# Patient Record
Sex: MALE | Race: WHITE | NOT HISPANIC OR LATINO | Employment: FULL TIME | URBAN - METROPOLITAN AREA
[De-identification: names, ages, dates, MRNs, and addresses within clinical notes are randomized per-mention and may not be internally consistent; named-entity substitution may affect disease eponyms.]

---

## 2023-04-27 ENCOUNTER — OFFICE VISIT (OUTPATIENT)
Dept: URGENT CARE | Facility: CLINIC | Age: 28
End: 2023-04-27
Payer: COMMERCIAL

## 2023-04-27 VITALS
DIASTOLIC BLOOD PRESSURE: 77 MMHG | BODY MASS INDEX: 23.7 KG/M2 | HEIGHT: 72 IN | TEMPERATURE: 98 F | HEART RATE: 84 BPM | SYSTOLIC BLOOD PRESSURE: 119 MMHG | WEIGHT: 175 LBS | OXYGEN SATURATION: 98 % | RESPIRATION RATE: 20 BRPM

## 2023-04-27 DIAGNOSIS — R31.9 HEMATURIA, UNSPECIFIED TYPE: Primary | ICD-10-CM

## 2023-04-27 LAB
BILIRUB UR QL STRIP: NEGATIVE
GLUCOSE UR QL STRIP: NEGATIVE
KETONES UR QL STRIP: NEGATIVE
LEUKOCYTE ESTERASE UR QL STRIP: NEGATIVE
PH, POC UA: 5 (ref 5–8)
POC BLOOD, URINE: POSITIVE
POC NITRATES, URINE: NEGATIVE
PROT UR QL STRIP: POSITIVE
SP GR UR STRIP: 1.02 (ref 1–1.03)
UROBILINOGEN UR STRIP-ACNC: NORMAL (ref 0.3–2.2)

## 2023-04-27 PROCEDURE — 81003 URINALYSIS AUTO W/O SCOPE: CPT | Mod: QW,S$GLB,, | Performed by: NURSE PRACTITIONER

## 2023-04-27 PROCEDURE — 87086 URINE CULTURE/COLONY COUNT: CPT | Performed by: NURSE PRACTITIONER

## 2023-04-27 PROCEDURE — 99203 PR OFFICE/OUTPT VISIT, NEW, LEVL III, 30-44 MIN: ICD-10-PCS | Mod: S$GLB,,, | Performed by: NURSE PRACTITIONER

## 2023-04-27 PROCEDURE — 81003 POCT URINALYSIS, DIPSTICK, AUTOMATED, W/O SCOPE: ICD-10-PCS | Mod: QW,S$GLB,, | Performed by: NURSE PRACTITIONER

## 2023-04-27 PROCEDURE — 99203 OFFICE O/P NEW LOW 30 MIN: CPT | Mod: S$GLB,,, | Performed by: NURSE PRACTITIONER

## 2023-04-27 RX ORDER — LISDEXAMFETAMINE DIMESYLATE 30 MG/1
30 CAPSULE ORAL EVERY MORNING
COMMUNITY
Start: 2023-03-16 | End: 2023-05-18 | Stop reason: CLARIF

## 2023-04-27 NOTE — PROGRESS NOTES
Subjective:      Patient ID: Jagdeep Ariza is a 27 y.o. male.    Vitals:  height is 6' (1.829 m) and weight is 79.4 kg (175 lb). His tympanic temperature is 98.3 °F (36.8 °C). His blood pressure is 119/77 and his pulse is 84. His respiration is 20 and oxygen saturation is 98%.     Chief Complaint: Hematuria    Patient presents today with blood in his urine that he realize after working out on today about an hour ago.   Provider note begins below      Denies abdominal pain, back pain, or urinary discharge.  Reports blood noted in urine.  He has been training for a marathon.  Has been running about 10 miles a day.  There was mention that there was not enough fluid intake or nutritional intake.  Father has a history of kidney stones.  Denies muscle aching or cramps denies contractures.  Wife is concerned that he may have rhabdomyolysis.    Hematuria  This is a new problem. The problem is unchanged. He describes the hematuria as gross hematuria. The hematuria occurs during the initial portion of his urinary stream. He reports no clotting in his urine stream. His pain is at a severity of 0/10. He is experiencing no pain. He describes his urine color as dark red. Irritative symptoms do not include frequency or urgency. Pertinent negatives include no chills, dysuria, fever, flank pain, nausea or vomiting.   Constitution: Negative for chills and fever.   Cardiovascular:  Negative for chest pain and sob on exertion.   Respiratory:  Negative for cough and shortness of breath.    Gastrointestinal:  Negative for nausea, vomiting, constipation and diarrhea.   Genitourinary:  Positive for hematuria. Negative for dysuria, frequency, urgency, urine decreased, flank pain, bladder incontinence and bed wetting.   Musculoskeletal:  Negative for muscle cramps and muscle ache.    Objective:     Physical Exam   Constitutional: He is oriented to person, place, and time.   HENT:   Head: Normocephalic and atraumatic.   Cardiovascular:  Normal rate, regular rhythm and normal heart sounds.   Pulmonary/Chest: Effort normal. No respiratory distress.   Abdominal: Normal appearance. He exhibits no distension and no mass. Soft. flat abdomen There is no abdominal tenderness. There is no rebound, no guarding, no left CVA tenderness and no right CVA tenderness. No hernia.   Neurological: He is alert and oriented to person, place, and time.   Skin: Skin is dry.   Psychiatric: His behavior is normal. Mood normal.       Results for orders placed or performed in visit on 04/27/23   POCT Urinalysis, Dipstick, Automated, W/O Scope   Result Value Ref Range    POC Blood, Urine Positive (A) Negative    POC Bilirubin, Urine Negative Negative    POC Urobilinogen, Urine normal 0.3 - 2.2    POC Ketones, Urine Negative Negative    POC Protein, Urine Positive (A) Negative    POC Nitrates, Urine Negative Negative    POC Glucose, Urine Negative Negative    pH, UA 5.0 5 - 8    POC Specific Gravity, Urine 1.020 1.003 - 1.029    POC Leukocytes, Urine Negative Negative      Assessment:     1. Hematuria, unspecified type        Plan:     Asymptomatic hematuria   Recommend fluids, electrolytes, increase calorie intake   Urine for culture  Referral for Urology  Emergency precautions given   No running for 3 days.      Hematuria, unspecified type  -     POCT Urinalysis, Dipstick, Automated, W/O Scope

## 2023-04-28 LAB — BACTERIA UR CULT: NO GROWTH

## 2023-04-29 ENCOUNTER — TELEPHONE (OUTPATIENT)
Dept: URGENT CARE | Facility: CLINIC | Age: 28
End: 2023-04-29
Payer: COMMERCIAL

## 2023-04-29 NOTE — TELEPHONE ENCOUNTER
Spoke to patient updated on negative urine culture results. Patient reports he no longer noticed blood in his urine but reports will schedule an appointment with urology for further evaluation.

## 2023-05-02 ENCOUNTER — OFFICE VISIT (OUTPATIENT)
Dept: UROLOGY | Facility: CLINIC | Age: 28
End: 2023-05-02
Payer: COMMERCIAL

## 2023-05-02 VITALS — HEART RATE: 67 BPM | SYSTOLIC BLOOD PRESSURE: 126 MMHG | DIASTOLIC BLOOD PRESSURE: 83 MMHG

## 2023-05-02 DIAGNOSIS — R31.0 GROSS HEMATURIA: Primary | ICD-10-CM

## 2023-05-02 PROCEDURE — 3074F SYST BP LT 130 MM HG: CPT | Mod: CPTII,S$GLB,, | Performed by: NURSE PRACTITIONER

## 2023-05-02 PROCEDURE — 1160F PR REVIEW ALL MEDS BY PRESCRIBER/CLIN PHARMACIST DOCUMENTED: ICD-10-PCS | Mod: CPTII,S$GLB,, | Performed by: NURSE PRACTITIONER

## 2023-05-02 PROCEDURE — 1159F PR MEDICATION LIST DOCUMENTED IN MEDICAL RECORD: ICD-10-PCS | Mod: CPTII,S$GLB,, | Performed by: NURSE PRACTITIONER

## 2023-05-02 PROCEDURE — 99203 OFFICE O/P NEW LOW 30 MIN: CPT | Mod: S$GLB,,, | Performed by: NURSE PRACTITIONER

## 2023-05-02 PROCEDURE — 1159F MED LIST DOCD IN RCRD: CPT | Mod: CPTII,S$GLB,, | Performed by: NURSE PRACTITIONER

## 2023-05-02 PROCEDURE — 1160F RVW MEDS BY RX/DR IN RCRD: CPT | Mod: CPTII,S$GLB,, | Performed by: NURSE PRACTITIONER

## 2023-05-02 PROCEDURE — 3079F DIAST BP 80-89 MM HG: CPT | Mod: CPTII,S$GLB,, | Performed by: NURSE PRACTITIONER

## 2023-05-02 PROCEDURE — 3079F PR MOST RECENT DIASTOLIC BLOOD PRESSURE 80-89 MM HG: ICD-10-PCS | Mod: CPTII,S$GLB,, | Performed by: NURSE PRACTITIONER

## 2023-05-02 PROCEDURE — 99203 PR OFFICE/OUTPT VISIT, NEW, LEVL III, 30-44 MIN: ICD-10-PCS | Mod: S$GLB,,, | Performed by: NURSE PRACTITIONER

## 2023-05-02 PROCEDURE — 3074F PR MOST RECENT SYSTOLIC BLOOD PRESSURE < 130 MM HG: ICD-10-PCS | Mod: CPTII,S$GLB,, | Performed by: NURSE PRACTITIONER

## 2023-05-02 RX ORDER — DEXTROAMPHETAMINE SULFATE 5 MG/1
5 TABLET ORAL DAILY
COMMUNITY
Start: 2023-04-25

## 2023-05-02 NOTE — PROGRESS NOTES
Subjective:      Jagdeep Ariza is a 27 y.o. male who was referred by Elina Balderas NP for evaluation of his hematuria.      The patient presented to urgent care on 5/27 with painless hematuria (dark red/brown urine) following exercise. Resolved spontaneously same day. Denies excessive muscle aches/cramps.     Hematuria  Patient complains of gross hematuria. Onset of hematuria was 5 days ago and was sudden in onset. There is not a history of nephrolithiasis. There is not a history of urologic trauma. Other urologic symptoms include none. Patient admits to history of no risk factors for cancer. Patient denies history of Agent Orange exposure, chronic Young catheter,  surgeries, occupational exposure, sexually transmitted diseases, tobacco use, trauma, and urolithiasis. Prior workup has been UA'S and culture.     Component      Latest Ref Rng & Units 4/27/2023   Urine Culture, Routine No growth     + family history of stones (father).     The following portions of the patient's history were reviewed and updated as appropriate: allergies, current medications, past family history, past medical history, past social history, past surgical history and problem list.    Review of Systems  Constitutional: no fever or chills  ENT: no nasal congestion or sore throat  Respiratory: no cough or shortness of breath  Cardiovascular: no chest pain or palpitations  Gastrointestinal: no nausea or vomiting, tolerating diet  Genitourinary: as per HPI  Hematologic/Lymphatic: no easy bruising or lymphadenopathy  Musculoskeletal: no arthralgias or myalgias  Neurological: no seizures or tremors  Behavioral/Psych: no auditory or visual hallucinations     Objective:   Vitals:   Vitals:    05/02/23 0712   BP: 126/83   Pulse: 67     Physical Exam   General: alert and oriented, no acute distress  Head: normocephalic, atraumatic  Neck: supple, normal ROM  Respiratory: Symmetric expansion, non-labored breathing  Cardiovascular: regular rate and  rhythm  Abdomen: soft, non tender, non distended  Genitourinary: deferred  Skin: normal coloration and turgor, no rashes, no suspicious skin lesions noted  Neuro: alert and oriented x3, no gross deficits  Psych: normal judgment and insight, normal mood/affect, and non-anxious    Lab Review   Urinalysis demonstrates negative for all components  No results found for: WBC, HGB, HCT, MCV, PLT  No results found for: CREATININE, BUN  No results found for: PSA  Imaging   None    Assessment:     1. Gross hematuria      Plan:   Diagnoses and all orders for this visit:    Gross hematuria  -     CT Urogram Abd Pelvis W WO; Future  -     Cystoscopy; Future  -     Basic Metabolic Panel; Future    Plan:  -- Discussed differential diagnosis for hematuria, including infection, nephrolithiasis, benign prostatic bleeding, and neoplasms of kidney, ureter, or bladder.  -- Recommended proceeding with full hematuria workup, to include CT urogram and cystoscopy  -- Will send urine for culture  -- CT urogram next available  -- Cystoscopy in clinic after CT

## 2023-05-05 ENCOUNTER — OFFICE VISIT (OUTPATIENT)
Dept: PRIMARY CARE CLINIC | Facility: CLINIC | Age: 28
End: 2023-05-05
Payer: COMMERCIAL

## 2023-05-05 VITALS
SYSTOLIC BLOOD PRESSURE: 102 MMHG | DIASTOLIC BLOOD PRESSURE: 68 MMHG | HEIGHT: 72 IN | WEIGHT: 175.06 LBS | OXYGEN SATURATION: 98 % | BODY MASS INDEX: 23.71 KG/M2 | HEART RATE: 40 BPM

## 2023-05-05 DIAGNOSIS — J31.0 CHRONIC RHINITIS: ICD-10-CM

## 2023-05-05 DIAGNOSIS — J45.40 MODERATE PERSISTENT ASTHMA WITHOUT COMPLICATION: Primary | ICD-10-CM

## 2023-05-05 PROCEDURE — 1160F RVW MEDS BY RX/DR IN RCRD: CPT | Mod: CPTII,S$GLB,, | Performed by: FAMILY MEDICINE

## 2023-05-05 PROCEDURE — 1159F PR MEDICATION LIST DOCUMENTED IN MEDICAL RECORD: ICD-10-PCS | Mod: CPTII,S$GLB,, | Performed by: FAMILY MEDICINE

## 2023-05-05 PROCEDURE — 3078F PR MOST RECENT DIASTOLIC BLOOD PRESSURE < 80 MM HG: ICD-10-PCS | Mod: CPTII,S$GLB,, | Performed by: FAMILY MEDICINE

## 2023-05-05 PROCEDURE — 3074F PR MOST RECENT SYSTOLIC BLOOD PRESSURE < 130 MM HG: ICD-10-PCS | Mod: CPTII,S$GLB,, | Performed by: FAMILY MEDICINE

## 2023-05-05 PROCEDURE — 3008F PR BODY MASS INDEX (BMI) DOCUMENTED: ICD-10-PCS | Mod: CPTII,S$GLB,, | Performed by: FAMILY MEDICINE

## 2023-05-05 PROCEDURE — 3008F BODY MASS INDEX DOCD: CPT | Mod: CPTII,S$GLB,, | Performed by: FAMILY MEDICINE

## 2023-05-05 PROCEDURE — 99203 OFFICE O/P NEW LOW 30 MIN: CPT | Mod: S$GLB,,, | Performed by: FAMILY MEDICINE

## 2023-05-05 PROCEDURE — 99203 PR OFFICE/OUTPT VISIT, NEW, LEVL III, 30-44 MIN: ICD-10-PCS | Mod: S$GLB,,, | Performed by: FAMILY MEDICINE

## 2023-05-05 PROCEDURE — 1159F MED LIST DOCD IN RCRD: CPT | Mod: CPTII,S$GLB,, | Performed by: FAMILY MEDICINE

## 2023-05-05 PROCEDURE — 1160F PR REVIEW ALL MEDS BY PRESCRIBER/CLIN PHARMACIST DOCUMENTED: ICD-10-PCS | Mod: CPTII,S$GLB,, | Performed by: FAMILY MEDICINE

## 2023-05-05 PROCEDURE — 3078F DIAST BP <80 MM HG: CPT | Mod: CPTII,S$GLB,, | Performed by: FAMILY MEDICINE

## 2023-05-05 PROCEDURE — 3074F SYST BP LT 130 MM HG: CPT | Mod: CPTII,S$GLB,, | Performed by: FAMILY MEDICINE

## 2023-05-05 PROCEDURE — 99999 PR PBB SHADOW E&M-EST. PATIENT-LVL III: ICD-10-PCS | Mod: PBBFAC,,, | Performed by: FAMILY MEDICINE

## 2023-05-05 PROCEDURE — 99999 PR PBB SHADOW E&M-EST. PATIENT-LVL III: CPT | Mod: PBBFAC,,, | Performed by: FAMILY MEDICINE

## 2023-05-05 RX ORDER — FLUTICASONE PROPIONATE 50 MCG
1 SPRAY, SUSPENSION (ML) NASAL DAILY
COMMUNITY
End: 2023-06-06

## 2023-05-05 RX ORDER — MONTELUKAST SODIUM 10 MG/1
10 TABLET ORAL NIGHTLY
Qty: 90 TABLET | Refills: 3 | Status: SHIPPED | OUTPATIENT
Start: 2023-05-05 | End: 2023-09-04 | Stop reason: SDUPTHER

## 2023-05-05 RX ORDER — ALBUTEROL SULFATE 90 UG/1
2 AEROSOL, METERED RESPIRATORY (INHALATION) EVERY 4 HOURS PRN
Qty: 18 G | Refills: 11 | Status: SHIPPED | OUTPATIENT
Start: 2023-05-05 | End: 2023-12-12 | Stop reason: SDUPTHER

## 2023-05-05 RX ORDER — MONTELUKAST SODIUM 10 MG/1
10 TABLET ORAL NIGHTLY
Qty: 90 TABLET | Refills: 3 | Status: SHIPPED | OUTPATIENT
Start: 2023-05-05 | End: 2023-05-05 | Stop reason: SDUPTHER

## 2023-05-05 NOTE — PROGRESS NOTES
Patient Name: Jagdeep Ariza    : 1995  MRN: [id number]      Subjective:     Patient ID: Jagdeep is a 27 y.o. male    Chief Complaint:  Asthma and Allergic Reaction    27-year-old male presents for evaluation of asthma.  Initially scheduled appointment to establish care however after he was informed that I do not routinely working this office, he scheduled an appointment with provider who does and for now would only like to address the asthma.  He reports increased asthma symptoms a multiple days a week, and multiple nights over the last few weeks.  He has not had a full-blown asthma.  However he has had to use his a albuterol inhaler more often.  He has an albuterol inhaler from [place].  He states that his asthma is triggered by allergies, which are triggered by animals including dogs and cats.  He owns a cat at home.       Review of Systems   Constitutional:  Negative for chills and fever.   Respiratory:  Positive for chest tightness, shortness of breath and wheezing.    Cardiovascular:  Negative for chest pain and palpitations.   Gastrointestinal:  Negative for abdominal pain and blood in stool.      Objective:   /68   Pulse (!) 40   Ht 6' (1.829 m)   Wt 79.4 kg (175 lb 0.7 oz)   SpO2 98%   BMI 23.74 kg/m²     Physical Exam  Vitals reviewed.   Constitutional:       General: He is not in acute distress.     Appearance: He is well-developed and normal weight. He is not diaphoretic.   HENT:      Head: Normocephalic.      Right Ear: External ear normal.      Left Ear: External ear normal.      Nose: Nose normal.      Mouth/Throat:      Pharynx: No oropharyngeal exudate.   Neck:      Trachea: No tracheal deviation.   Cardiovascular:      Rate and Rhythm: Normal rate and regular rhythm.      Heart sounds: Normal heart sounds.   Pulmonary:      Effort: Pulmonary effort is normal.      Breath sounds: Normal breath sounds. No wheezing or rales.   Abdominal:      General: Bowel sounds are normal.       Palpations: Abdomen is soft. Abdomen is not rigid. There is no mass.      Tenderness: There is no abdominal tenderness. There is no guarding or rebound.   Musculoskeletal:      Cervical back: Normal range of motion and neck supple.   Lymphadenopathy:      Cervical: No cervical adenopathy.   Neurological:      Mental Status: He is alert and oriented to person, place, and time.      Gait: Gait normal.      Assessment        ICD-10-CM ICD-9-CM   1. Moderate persistent asthma without complication  J45.40 493.90   2. Chronic rhinitis  J31.0 472.0         Plan:     1. Moderate persistent asthma without complication  -     Discontinue: montelukast (SINGULAIR) 10 mg tablet; Take 1 tablet (10 mg total) by mouth every evening.  Dispense: 90 tablet; Refill: 3  -     montelukast (SINGULAIR) 10 mg tablet; Take 1 tablet (10 mg total) by mouth every evening.  Dispense: 90 tablet; Refill: 3  -     albuterol (PROVENTIL/VENTOLIN HFA) 90 mcg/actuation inhaler; Inhale 2 puffs into the lungs every 4 (four) hours as needed for Wheezing or Shortness of Breath. Rescue  Dispense: 18 g; Refill: 11    2. Chronic rhinitis  -     montelukast (SINGULAIR) 10 mg tablet; Take 1 tablet (10 mg total) by mouth every evening.  Dispense: 90 tablet; Refill: 3         Discussed starting Singulair as control of her asthma as well as a management of chronic rhinitis.  Discussed black box warning with patient.  Patient agreeable to start medication.    Prescription for albuterol inhaler provided.  Patient educated on correct use of inhaler via video: [email or web address]/[email or web address]?[email or web address].  Patient was able to explain back how to use inhaler.        -Hilario Holt Jr., MD, AAHIVS      This office note has been dictated.  This dictation has been generated using M-Modal Fluency Direct dictation; some phonetic errors may occur.         There are no Patient Instructions on file for this visit.      Future Appointments   Date Time Provider Department  Center   5/10/2023  2:00 PM BAP CT OP LIMIT [address] [facility]   6/6/2023 10:00 AM [facility]

## 2023-05-10 ENCOUNTER — HOSPITAL ENCOUNTER (OUTPATIENT)
Dept: RADIOLOGY | Facility: OTHER | Age: 28
Discharge: HOME | End: 2023-05-10
Attending: NURSE PRACTITIONER
Payer: COMMERCIAL

## 2023-05-10 DIAGNOSIS — R31.0 GROSS HEMATURIA: ICD-10-CM

## 2023-05-10 DIAGNOSIS — N20.0 NEPHROLITHIASIS: Primary | ICD-10-CM

## 2023-05-10 PROCEDURE — 74178 CT ABD&PLV WO CNTR FLWD CNTR: CPT | Mod: TC

## 2023-05-10 PROCEDURE — 25500020 PHARM REV CODE 255: Performed by: NURSE PRACTITIONER

## 2023-05-10 PROCEDURE — 74178 CT ABD&PLV WO CNTR FLWD CNTR: CPT | Mod: 26,,, | Performed by: RADIOLOGY

## 2023-05-10 PROCEDURE — 74178 CT UROGRAM ABD PELVIS W WO: ICD-10-PCS | Mod: 26,,, | Performed by: RADIOLOGY

## 2023-05-10 RX ADMIN — IOHEXOL 125 ML: 350 INJECTION, SOLUTION INTRAVENOUS at 02:05

## 2023-05-11 ENCOUNTER — HOSPITAL ENCOUNTER (OUTPATIENT)
Dept: RADIOLOGY | Facility: OTHER | Age: 28
Discharge: HOME | End: 2023-05-11
Attending: NURSE PRACTITIONER
Payer: COMMERCIAL

## 2023-05-11 DIAGNOSIS — N20.0 NEPHROLITHIASIS: ICD-10-CM

## 2023-05-11 PROCEDURE — 74018 RADEX ABDOMEN 1 VIEW: CPT | Mod: 26,,, | Performed by: RADIOLOGY

## 2023-05-11 PROCEDURE — 74018 RADEX ABDOMEN 1 VIEW: CPT | Mod: TC,FY

## 2023-05-11 PROCEDURE — 74018 XR KUB: ICD-10-PCS | Mod: 26,,, | Performed by: RADIOLOGY

## 2023-05-12 ENCOUNTER — OFFICE VISIT (OUTPATIENT)
Dept: UROLOGY | Facility: CLINIC | Age: 28
End: 2023-05-12
Payer: COMMERCIAL

## 2023-05-12 VITALS
SYSTOLIC BLOOD PRESSURE: 126 MMHG | BODY MASS INDEX: 23.59 KG/M2 | DIASTOLIC BLOOD PRESSURE: 61 MMHG | HEART RATE: 42 BPM | HEIGHT: 72 IN | WEIGHT: 174.19 LBS

## 2023-05-12 DIAGNOSIS — N20.0 NEPHROLITHIASIS: Primary | ICD-10-CM

## 2023-05-12 DIAGNOSIS — R31.0 GROSS HEMATURIA: ICD-10-CM

## 2023-05-12 PROCEDURE — 99214 OFFICE O/P EST MOD 30 MIN: CPT | Mod: S$GLB,,, | Performed by: NURSE PRACTITIONER

## 2023-05-12 PROCEDURE — 3008F PR BODY MASS INDEX (BMI) DOCUMENTED: ICD-10-PCS | Mod: CPTII,S$GLB,, | Performed by: NURSE PRACTITIONER

## 2023-05-12 PROCEDURE — 3074F SYST BP LT 130 MM HG: CPT | Mod: CPTII,S$GLB,, | Performed by: NURSE PRACTITIONER

## 2023-05-12 PROCEDURE — 1160F RVW MEDS BY RX/DR IN RCRD: CPT | Mod: CPTII,S$GLB,, | Performed by: NURSE PRACTITIONER

## 2023-05-12 PROCEDURE — 99214 PR OFFICE/OUTPT VISIT, EST, LEVL IV, 30-39 MIN: ICD-10-PCS | Mod: S$GLB,,, | Performed by: NURSE PRACTITIONER

## 2023-05-12 PROCEDURE — 3074F PR MOST RECENT SYSTOLIC BLOOD PRESSURE < 130 MM HG: ICD-10-PCS | Mod: CPTII,S$GLB,, | Performed by: NURSE PRACTITIONER

## 2023-05-12 PROCEDURE — 3078F PR MOST RECENT DIASTOLIC BLOOD PRESSURE < 80 MM HG: ICD-10-PCS | Mod: CPTII,S$GLB,, | Performed by: NURSE PRACTITIONER

## 2023-05-12 PROCEDURE — 1159F MED LIST DOCD IN RCRD: CPT | Mod: CPTII,S$GLB,, | Performed by: NURSE PRACTITIONER

## 2023-05-12 PROCEDURE — 3078F DIAST BP <80 MM HG: CPT | Mod: CPTII,S$GLB,, | Performed by: NURSE PRACTITIONER

## 2023-05-12 PROCEDURE — 1159F PR MEDICATION LIST DOCUMENTED IN MEDICAL RECORD: ICD-10-PCS | Mod: CPTII,S$GLB,, | Performed by: NURSE PRACTITIONER

## 2023-05-12 PROCEDURE — 1160F PR REVIEW ALL MEDS BY PRESCRIBER/CLIN PHARMACIST DOCUMENTED: ICD-10-PCS | Mod: CPTII,S$GLB,, | Performed by: NURSE PRACTITIONER

## 2023-05-12 PROCEDURE — 3008F BODY MASS INDEX DOCD: CPT | Mod: CPTII,S$GLB,, | Performed by: NURSE PRACTITIONER

## 2023-05-12 RX ORDER — CIPROFLOXACIN 2 MG/ML
400 INJECTION, SOLUTION INTRAVENOUS
Status: CANCELLED | OUTPATIENT
Start: 2023-05-12

## 2023-05-12 RX ORDER — SODIUM CHLORIDE 9 MG/ML
INJECTION, SOLUTION INTRAVENOUS CONTINUOUS
Status: CANCELLED | OUTPATIENT
Start: 2023-05-12

## 2023-05-12 RX ORDER — KETOROLAC TROMETHAMINE 10 MG/1
10 TABLET, FILM COATED ORAL EVERY 6 HOURS PRN
Qty: 20 TABLET | Refills: 0 | Status: SHIPPED | OUTPATIENT
Start: 2023-05-12 | End: 2023-05-18 | Stop reason: CLARIF

## 2023-05-12 NOTE — H&P
"Subjective:      Jagdeep Ariza is a 27 y.o. male who returns today regarding his nephrolithiasis.    The patient presented to urgent care on 5/27 with painless hematuria (dark red/brown urine) following exercise. Resolved spontaneously same day. Denies excessive muscle aches/cramps.      Hematuria  Patient complains of gross hematuria. Onset of hematuria was 5 days ago and was sudden in onset. There is not a history of nephrolithiasis. There is not a history of urologic trauma. Other urologic symptoms include none. Patient admits to history of no risk factors for cancer. Patient denies history of Agent Orange exposure, chronic Young catheter,  surgeries, occupational exposure, sexually transmitted diseases, tobacco use, trauma, and urolithiasis. Prior workup has been UA'S and culture.      Component      Latest Ref Rng & Units 4/27/2023   Urine Culture, Routine No growth      + family history of stones (father).     CT urogram demonstrated "Kidneys are symmetric in size.  Precontrast imaging shows a calcified stone upper pole left kidney measuring 8 mm.  Kidneys concentrate and excrete contrast appropriately on postcontrast imaging.  No focal filling defects.  Urinary bladder shows mild circumferential wall thickening."    Returns today with KUB- "Nonspecific, nonobstructive bowel gas pattern.  Large retained stool in the colon.  Calcification projecting over the upper pole left kidney measures 5 mm.  No suspicious calcifications along the course of either ureter or urinary bladder."    Denies flank pain or further hematuria.    The following portions of the patient's history were reviewed and updated as appropriate: allergies, current medications, past family history, past medical history, past social history, past surgical history and problem list.    Review of Systems  Constitutional: no fever or chills  ENT: no nasal congestion or sore throat  Respiratory: no cough or shortness of breath  Cardiovascular: no " "chest pain or palpitations  Gastrointestinal: no nausea or vomiting, tolerating diet  Genitourinary: as per HPI  Hematologic/Lymphatic: no easy bruising or lymphadenopathy  Musculoskeletal: no arthralgias or myalgias  Neurological: no seizures or tremors  Behavioral/Psych: no auditory or visual hallucinations     Objective:   Vitals:   Vitals:    05/12/23 0930   BP: 126/61   Pulse: (!) 42     Physical Exam   General: alert and oriented, no acute distress  Head: normocephalic, atraumatic  Neck: supple, normal ROM  Respiratory: Symmetric expansion, non-labored breathing  Cardiovascular: regular rate and rhythm  Abdomen: soft, non tender, non distended  Genitourinary: deferred  Skin: normal coloration and turgor, no rashes, no suspicious skin lesions noted  Neuro: alert and oriented x3, no gross deficits  Psych: normal judgment and insight, normal mood/affect, and non-anxious    Lab Review   Urinalysis demonstrates :no sample   No results found for: WBC, HGB, HCT, MCV, PLT  Lab Results   Component Value Date    CREATININE 1.0 05/02/2023    BUN 10 05/02/2023     No results found for: PSA  Imaging   (all images personally reviewed; agree with report below)  CT urogram- "Kidneys are symmetric in size.  Precontrast imaging shows a calcified stone upper pole left kidney measuring 8 mm.  Kidneys concentrate and excrete contrast appropriately on postcontrast imaging.  No focal filling defects.  Urinary bladder shows mild circumferential wall thickening."  KUB- "Nonspecific, nonobstructive bowel gas pattern.  Large retained stool in the colon.  Calcification projecting over the upper pole left kidney measures 5 mm.  No suspicious calcifications along the course of either ureter or urinary bladder."    Assessment:     1. Nephrolithiasis    2. Gross hematuria      Plan:   Jagdeep was seen today for nephrolithiasis.    Diagnoses and all orders for this visit:    Nephrolithiasis  -     ketorolac (TORADOL) 10 mg tablet; Take 1 " tablet (10 mg total) by mouth every 6 (six) hours as needed for Pain.    Gross hematuria    Plan:  --Advised of the options of watch and wait vs intervention via ureteroscopic approach vs ESWL. Pt elects to proceed with L ESWL + cysto (to complete hematuria workup) with Dr. Nye on 5/30. Discussed the risks/benefits of the procedure. Answered all questions. Consent signed  --Toradol PRN if pain develops   --Recommend general preventive measures, to include increased hydration, low Na diet, and increased citrus intake  --Discussed indications to present to ED, including fever, intractable pain, and intractable nausea/vomitting

## 2023-05-18 ENCOUNTER — HOSPITAL ENCOUNTER (OUTPATIENT)
Dept: PREADMISSION TESTING | Facility: OTHER | Age: 28
Discharge: HOME | End: 2023-05-18
Attending: UROLOGY
Payer: COMMERCIAL

## 2023-05-18 ENCOUNTER — ANESTHESIA EVENT (OUTPATIENT)
Dept: SURGERY | Facility: OTHER | Age: 28
End: 2023-05-18
Payer: COMMERCIAL

## 2023-05-18 VITALS
HEIGHT: 72 IN | SYSTOLIC BLOOD PRESSURE: 115 MMHG | RESPIRATION RATE: 18 BRPM | OXYGEN SATURATION: 99 % | BODY MASS INDEX: 23.57 KG/M2 | WEIGHT: 174 LBS | HEART RATE: 50 BPM | DIASTOLIC BLOOD PRESSURE: 65 MMHG | TEMPERATURE: 98 F

## 2023-05-18 RX ORDER — SODIUM CHLORIDE 0.9 % (FLUSH) 0.9 %
3 SYRINGE (ML) INJECTION
Status: DISCONTINUED | OUTPATIENT
Start: 2023-05-18 | End: 2023-05-19 | Stop reason: HOSPADM

## 2023-05-18 RX ORDER — ACETAMINOPHEN 500 MG
1000 TABLET ORAL
Status: CANCELLED | OUTPATIENT
Start: 2023-05-18 | End: 2023-05-18

## 2023-05-18 RX ORDER — LIDOCAINE HYDROCHLORIDE 10 MG/ML
0.5 INJECTION, SOLUTION EPIDURAL; INFILTRATION; INTRACAUDAL; PERINEURAL ONCE
Status: CANCELLED | OUTPATIENT
Start: 2023-05-18 | End: 2023-05-18

## 2023-05-18 RX ORDER — PROCHLORPERAZINE EDISYLATE 5 MG/ML
5 INJECTION INTRAMUSCULAR; INTRAVENOUS EVERY 30 MIN PRN
Status: CANCELLED | OUTPATIENT
Start: 2023-05-18

## 2023-05-18 RX ORDER — DIPHENHYDRAMINE HYDROCHLORIDE 50 MG/ML
25 INJECTION INTRAMUSCULAR; INTRAVENOUS EVERY 6 HOURS PRN
Status: CANCELLED | OUTPATIENT
Start: 2023-05-18

## 2023-05-18 RX ORDER — ALBUTEROL SULFATE 2.5 MG/.5ML
2.5 SOLUTION RESPIRATORY (INHALATION) EVERY 4 HOURS PRN
Status: CANCELLED | OUTPATIENT
Start: 2023-05-18

## 2023-05-18 RX ORDER — HYDROMORPHONE HYDROCHLORIDE 2 MG/ML
0.4 INJECTION, SOLUTION INTRAMUSCULAR; INTRAVENOUS; SUBCUTANEOUS EVERY 5 MIN PRN
Status: CANCELLED | OUTPATIENT
Start: 2023-05-18

## 2023-05-18 RX ORDER — MEPERIDINE HYDROCHLORIDE 25 MG/ML
12.5 INJECTION INTRAMUSCULAR; INTRAVENOUS; SUBCUTANEOUS ONCE AS NEEDED
Status: CANCELLED | OUTPATIENT
Start: 2023-05-18 | End: 2023-05-19

## 2023-05-18 RX ORDER — SODIUM CHLORIDE, SODIUM LACTATE, POTASSIUM CHLORIDE, CALCIUM CHLORIDE 600; 310; 30; 20 MG/100ML; MG/100ML; MG/100ML; MG/100ML
INJECTION, SOLUTION INTRAVENOUS CONTINUOUS
Status: CANCELLED | OUTPATIENT
Start: 2023-05-18

## 2023-05-18 RX ORDER — OXYCODONE HYDROCHLORIDE 5 MG/1
5 TABLET ORAL
Status: CANCELLED | OUTPATIENT
Start: 2023-05-18

## 2023-05-18 NOTE — ANESTHESIA PREPROCEDURE EVALUATION
05/18/2023  Jagdeep Ariza is a 27 y.o., male.      Pre-op Assessment    I have reviewed the Patient Summary Reports.     I have reviewed the Nursing Notes. I have reviewed the NPO Status.   I have reviewed the Medications.     Review of Systems  Anesthesia Hx:  Denies Family Hx of Anesthesia complications.   Denies Personal Hx of Anesthesia complications.   Social:  Non-Smoker    Hematology/Oncology:  Hematology Normal   Oncology Normal     EENT/Dental:EENT/Dental Normal   Cardiovascular:  Cardiovascular Normal     Pulmonary:   Asthma    Renal/:   Chronic Renal Disease renal calculi    Hepatic/GI:  Hepatic/GI Normal    Musculoskeletal:  Musculoskeletal Normal    Neurological:  Neurology Normal    Endocrine:  Endocrine Normal    Dermatological:  Skin Normal    Psych:  Psychiatric Normal           Physical Exam  General: Well nourished, Cooperative, Alert and Oriented    Airway:  Mallampati: II   Mouth Opening: Normal  TM Distance: Normal  Tongue: Normal  Neck ROM: Normal ROM    Dental:  Intact        Anesthesia Plan  Type of Anesthesia, risks & benefits discussed:    Anesthesia Type: Gen Supraglottic Airway  Intra-op Monitoring Plan: Standard ASA Monitors  Post Op Pain Control Plan: multimodal analgesia  Induction:  IV  Informed Consent: Informed consent signed with the Patient and all parties understand the risks and agree with anesthesia plan.  All questions answered.   ASA Score: 2  Anesthesia Plan Notes: resp tx ordered    Ready For Surgery From Anesthesia Perspective.     .

## 2023-05-18 NOTE — DISCHARGE INSTRUCTIONS
Information to Prepare you for your Surgery    PRE-ADMIT TESTING -  [phone]    [address] [facility]          Your surgery has been scheduled at [facility]. We are pleased to have the opportunity to serve you. For Further Information please call [phone].    On the day of surgery please report to the Information Desk on the 1st floor.    CONTACT YOUR PHYSICIAN'S OFFICE THE DAY PRIOR TO YOUR SURGERY TO OBTAIN YOUR ARRIVAL TIME.     The evening before surgery do not eat anything after 9 p.m. ( this includes hard candy, chewing gum and mints).  You may only have GATORADE, POWERADE AND WATER  from 9 p.m. until you leave your home.   DO NOT DRINK ANY LIQUIDS ON THE WAY TO THE HOSPITAL.      Why does your anesthesiologist allow you to drink Gatorade/Powerade before surgery?  Gatorade/Powerade helps to increase your comfort before surgery and to decrease your nausea after surgery. The carbohydrates in Gatorade/Powerade help reduce your body's stress response to surgery.  If you are a diabetic-drink only water prior to surgery.       Patients may have 2 visitors pre and post procedure. Only 2 visitors will be allowed in the Surgical building with the patient. No one under the age of 12 will be allowed into the facility.    SPECIAL MEDICATION INSTRUCTIONS: TAKE medications checked off by the Anesthesiologist on your Medication List.    Angiogram Patients: Take medications as instructed by your physician, including aspirin.     Surgery Patients:    If you take ASPIRIN - Your PHYSICIAN/SURGEON will need to inform you IF/OR when you need to stop taking aspirin prior to your surgery.     The week prior to surgery do not ot take any medications containing IBUPROFEN or NSAIDS ( Advil, Motrin, Goodys, BC, Aleve, Naproxen etc) If you are not sure if you should take a medicine please call your surgeon's office.  Ok to take Tylenol    Do Not Wear any make-up  (especially eye make-up) to surgery. Please remove any false eyelashes or eyelash extensions. If you arrive the day of surgery with makeup/eyelashes on you will be required to remove prior to surgery. (There is a risk of corneal abrasions if eye makeup/eyelash extensions are not removed)      Leave all valuables at home.   Do Not wear any jewelry or watches, including any metal in body piercings. Jewelry must be removed prior to coming to the hospital.  There is a possibility that rings that are unable to be removed may be cut off if they are on the surgical extremity.    Please remove all hair extensions, wigs, clips and any other metal accessories/ ornaments from your hair.  These items may pose a flammable/fire risk in Surgery and must be removed.    Do not shave your surgical area at least 5 days prior to your surgery. The surgical prep will be performed at the hospital according to Infection Control regulations.    Contact Lens must be removed before surgery. Either do not wear the contact lens or bring a case and solution for storage.  Please bring a container for eyeglasses or dentures as required.  Bring any paperwork your physician has provided, such as consent forms,  history and physicals, doctor's orders, etc.   Bring comfortable clothes that are loose fitting to wear upon discharge. Take into consideration the type of surgery being performed.  Maintain your diet as advised per your physician the day prior to surgery.      Adequate rest the night before surgery is advised.   Park in the Parking lot behind the hospital or in the [facility] across the street from the parking lot. Parking is complimentary.  If you will be discharged the same day as your procedure, please arrange for a responsible adult to drive you home or to accompany you if traveling by taxi.   YOU WILL NOT BE PERMITTED TO DRIVE OR TO LEAVE THE HOSPITAL ALONE AFTER SURGERY.   If you are being discharged the same day, it is  strongly recommended that you arrange for someone to remain with you for the first 24 hrs following your surgery.    The Surgeon will speak to your family/visitor after your surgery regarding the outcome of your surgery and post op care.  The Surgeon may speak to you after your surgery, but there is a possibility you may not remember the details.  Please check with your family members regarding the conversation with the Surgeon.    We strongly recommend whoever is bringing you home be present for discharge instructions.  This will ensure a thorough understanding for your post op home care.    ALL CHILDREN MUST ALWAYS BE ACCOMPANIED BY AN ADULT.    Visitors-Refer to current Visitor policy handouts.    Thank you for your cooperation.  The Staff of [facility].            Bathing Instructions with Hibiclens    Shower the evening before and morning of your procedure with Chlorhexidine (Hibiclens)  do not use Chlorhexidine on your face or genitals. Do not get in your eyes.  Wash your face with water and your regular face wash/soap  Use your regular shampoo  Apply Chlorhexidine (Hibiclens) directly on your skin or on a wet washcloth and wash gently. When showering: Move away from the shower stream when applying Chlorhexidine (Hibiclens) to avoid rinsing off too soon.  Rinse thoroughly with warm water  Do not dilute Chlorhexidine (Hibiclens)   Dry off as usual, do not use any deodorant, powder, body lotions, perfume, after shave or cologne.

## 2023-05-23 ENCOUNTER — TELEPHONE (OUTPATIENT)
Dept: UROLOGY | Facility: CLINIC | Age: 28
End: 2023-05-23
Payer: COMMERCIAL

## 2023-05-23 ENCOUNTER — PATIENT OUTREACH (OUTPATIENT)
Dept: ADMINISTRATIVE | Facility: HOSPITAL | Age: 28
End: 2023-05-23
Payer: COMMERCIAL

## 2023-05-23 NOTE — TELEPHONE ENCOUNTER
Called pt to get him scheduled for his procedure with Dr. Nye. Have tried calling before, but no response. Left a detailed message.

## 2023-05-29 ENCOUNTER — TELEPHONE (OUTPATIENT)
Dept: UROLOGY | Facility: CLINIC | Age: 28
End: 2023-05-29
Payer: COMMERCIAL

## 2023-05-29 NOTE — TELEPHONE ENCOUNTER
Spoke with pt to let him know that his procedure has been canceled due to machine not available. I did not know I had to call for it. Pt was very upset and I get it. I was not aware of this detail. Will reach out to pt once I have things figured out.

## 2023-05-31 ENCOUNTER — ANESTHESIA (OUTPATIENT)
Dept: SURGERY | Facility: OTHER | Age: 28
End: 2023-05-31
Payer: COMMERCIAL

## 2023-05-31 ENCOUNTER — HOSPITAL ENCOUNTER (OUTPATIENT)
Facility: OTHER | Age: 28
Discharge: HOME | End: 2023-05-31
Attending: UROLOGY | Admitting: UROLOGY
Payer: COMMERCIAL

## 2023-05-31 DIAGNOSIS — R31.0 GROSS HEMATURIA: ICD-10-CM

## 2023-05-31 DIAGNOSIS — N20.0 NEPHROLITHIASIS: Primary | ICD-10-CM

## 2023-05-31 PROCEDURE — D9220A PRA ANESTHESIA: ICD-10-PCS | Mod: ANES,,, | Performed by: ANESTHESIOLOGY

## 2023-05-31 PROCEDURE — D9220A PRA ANESTHESIA: ICD-10-PCS | Mod: CRNA,,, | Performed by: NURSE ANESTHETIST, CERTIFIED REGISTERED

## 2023-05-31 PROCEDURE — 37000009 HC ANESTHESIA EA ADD 15 MINS: Performed by: UROLOGY

## 2023-05-31 PROCEDURE — 25000242 PHARM REV CODE 250 ALT 637 W/ HCPCS: Performed by: ANESTHESIOLOGY

## 2023-05-31 PROCEDURE — 94761 N-INVAS EAR/PLS OXIMETRY MLT: CPT

## 2023-05-31 PROCEDURE — D9220A PRA ANESTHESIA: Mod: CRNA,,, | Performed by: NURSE ANESTHETIST, CERTIFIED REGISTERED

## 2023-05-31 PROCEDURE — 63600175 PHARM REV CODE 636 W HCPCS: Performed by: NURSE ANESTHETIST, CERTIFIED REGISTERED

## 2023-05-31 PROCEDURE — C1758 CATHETER, URETERAL: HCPCS | Performed by: UROLOGY

## 2023-05-31 PROCEDURE — 37000008 HC ANESTHESIA 1ST 15 MINUTES: Performed by: UROLOGY

## 2023-05-31 PROCEDURE — 50590 FRAGMENTING OF KIDNEY STONE: CPT | Mod: LT,,, | Performed by: UROLOGY

## 2023-05-31 PROCEDURE — 71000016 HC POSTOP RECOV ADDL HR: Performed by: UROLOGY

## 2023-05-31 PROCEDURE — 36000707: Performed by: UROLOGY

## 2023-05-31 PROCEDURE — 50590 PR FRAGMENT KIDNEY STONE/ ESWL: ICD-10-PCS | Mod: LT,,, | Performed by: UROLOGY

## 2023-05-31 PROCEDURE — 36000706: Performed by: UROLOGY

## 2023-05-31 PROCEDURE — 52000 CYSTOURETHROSCOPY: CPT | Mod: 59,,, | Performed by: UROLOGY

## 2023-05-31 PROCEDURE — 25000003 PHARM REV CODE 250: Performed by: NURSE ANESTHETIST, CERTIFIED REGISTERED

## 2023-05-31 PROCEDURE — 71000033 HC RECOVERY, INTIAL HOUR: Performed by: UROLOGY

## 2023-05-31 PROCEDURE — 25000003 PHARM REV CODE 250: Performed by: ANESTHESIOLOGY

## 2023-05-31 PROCEDURE — 63600175 PHARM REV CODE 636 W HCPCS: Performed by: ANESTHESIOLOGY

## 2023-05-31 PROCEDURE — D9220A PRA ANESTHESIA: Mod: ANES,,, | Performed by: ANESTHESIOLOGY

## 2023-05-31 PROCEDURE — 94640 AIRWAY INHALATION TREATMENT: CPT

## 2023-05-31 PROCEDURE — 52000 PR CYSTOURETHROSCOPY: ICD-10-PCS | Mod: 59,,, | Performed by: UROLOGY

## 2023-05-31 PROCEDURE — 71000015 HC POSTOP RECOV 1ST HR: Performed by: UROLOGY

## 2023-05-31 PROCEDURE — 63600175 PHARM REV CODE 636 W HCPCS: Performed by: NURSE PRACTITIONER

## 2023-05-31 RX ORDER — OXYCODONE HYDROCHLORIDE 5 MG/1
5 TABLET ORAL
Status: DISCONTINUED | OUTPATIENT
Start: 2023-05-31 | End: 2023-05-31 | Stop reason: HOSPADM

## 2023-05-31 RX ORDER — MIDAZOLAM HYDROCHLORIDE 1 MG/ML
INJECTION INTRAMUSCULAR; INTRAVENOUS
Status: DISCONTINUED | OUTPATIENT
Start: 2023-05-31 | End: 2023-05-31

## 2023-05-31 RX ORDER — PROPOFOL 10 MG/ML
VIAL (ML) INTRAVENOUS
Status: DISCONTINUED | OUTPATIENT
Start: 2023-05-31 | End: 2023-05-31

## 2023-05-31 RX ORDER — SODIUM CHLORIDE 9 MG/ML
INJECTION, SOLUTION INTRAVENOUS CONTINUOUS
Status: DISCONTINUED | OUTPATIENT
Start: 2023-05-31 | End: 2023-05-31 | Stop reason: HOSPADM

## 2023-05-31 RX ORDER — LIDOCAINE HYDROCHLORIDE 20 MG/ML
INJECTION INTRAVENOUS
Status: DISCONTINUED | OUTPATIENT
Start: 2023-05-31 | End: 2023-05-31

## 2023-05-31 RX ORDER — FENTANYL CITRATE 50 UG/ML
INJECTION, SOLUTION INTRAMUSCULAR; INTRAVENOUS
Status: DISCONTINUED | OUTPATIENT
Start: 2023-05-31 | End: 2023-05-31

## 2023-05-31 RX ORDER — LIDOCAINE HYDROCHLORIDE 10 MG/ML
0.5 INJECTION, SOLUTION EPIDURAL; INFILTRATION; INTRACAUDAL; PERINEURAL ONCE
Status: DISCONTINUED | OUTPATIENT
Start: 2023-05-31 | End: 2023-05-31 | Stop reason: HOSPADM

## 2023-05-31 RX ORDER — TAMSULOSIN HYDROCHLORIDE 0.4 MG/1
0.4 CAPSULE ORAL DAILY
Qty: 30 CAPSULE | Refills: 0 | Status: SHIPPED | OUTPATIENT
Start: 2023-05-31 | End: 2023-06-30

## 2023-05-31 RX ORDER — SODIUM CHLORIDE, SODIUM LACTATE, POTASSIUM CHLORIDE, CALCIUM CHLORIDE 600; 310; 30; 20 MG/100ML; MG/100ML; MG/100ML; MG/100ML
INJECTION, SOLUTION INTRAVENOUS CONTINUOUS
Status: DISCONTINUED | OUTPATIENT
Start: 2023-05-31 | End: 2023-05-31 | Stop reason: HOSPADM

## 2023-05-31 RX ORDER — DEXAMETHASONE SODIUM PHOSPHATE 4 MG/ML
INJECTION, SOLUTION INTRA-ARTICULAR; INTRALESIONAL; INTRAMUSCULAR; INTRAVENOUS; SOFT TISSUE
Status: DISCONTINUED | OUTPATIENT
Start: 2023-05-31 | End: 2023-05-31

## 2023-05-31 RX ORDER — CIPROFLOXACIN 2 MG/ML
400 INJECTION, SOLUTION INTRAVENOUS
Status: COMPLETED | OUTPATIENT
Start: 2023-05-31 | End: 2023-05-31

## 2023-05-31 RX ORDER — PROCHLORPERAZINE EDISYLATE 5 MG/ML
5 INJECTION INTRAMUSCULAR; INTRAVENOUS EVERY 30 MIN PRN
Status: DISCONTINUED | OUTPATIENT
Start: 2023-05-31 | End: 2023-05-31 | Stop reason: HOSPADM

## 2023-05-31 RX ORDER — ALBUTEROL SULFATE 2.5 MG/.5ML
2.5 SOLUTION RESPIRATORY (INHALATION) EVERY 4 HOURS PRN
Status: DISCONTINUED | OUTPATIENT
Start: 2023-05-31 | End: 2023-05-31 | Stop reason: HOSPADM

## 2023-05-31 RX ORDER — ONDANSETRON 2 MG/ML
INJECTION INTRAMUSCULAR; INTRAVENOUS
Status: DISCONTINUED | OUTPATIENT
Start: 2023-05-31 | End: 2023-05-31

## 2023-05-31 RX ORDER — MEPERIDINE HYDROCHLORIDE 25 MG/ML
12.5 INJECTION INTRAMUSCULAR; INTRAVENOUS; SUBCUTANEOUS ONCE AS NEEDED
Status: DISCONTINUED | OUTPATIENT
Start: 2023-05-31 | End: 2023-05-31 | Stop reason: HOSPADM

## 2023-05-31 RX ORDER — KETOROLAC TROMETHAMINE 30 MG/ML
INJECTION, SOLUTION INTRAMUSCULAR; INTRAVENOUS
Status: DISCONTINUED | OUTPATIENT
Start: 2023-05-31 | End: 2023-05-31

## 2023-05-31 RX ORDER — ACETAMINOPHEN 500 MG
1000 TABLET ORAL
Status: COMPLETED | OUTPATIENT
Start: 2023-05-31 | End: 2023-05-31

## 2023-05-31 RX ORDER — HYDROMORPHONE HYDROCHLORIDE 2 MG/ML
0.4 INJECTION, SOLUTION INTRAMUSCULAR; INTRAVENOUS; SUBCUTANEOUS EVERY 5 MIN PRN
Status: DISCONTINUED | OUTPATIENT
Start: 2023-05-31 | End: 2023-05-31 | Stop reason: HOSPADM

## 2023-05-31 RX ORDER — DIPHENHYDRAMINE HYDROCHLORIDE 50 MG/ML
25 INJECTION INTRAMUSCULAR; INTRAVENOUS EVERY 6 HOURS PRN
Status: DISCONTINUED | OUTPATIENT
Start: 2023-05-31 | End: 2023-05-31 | Stop reason: HOSPADM

## 2023-05-31 RX ADMIN — CARBOXYMETHYLCELLULOSE SODIUM 2 DROP: 2.5 SOLUTION/ DROPS OPHTHALMIC at 10:05

## 2023-05-31 RX ADMIN — CIPROFLOXACIN 400 MG: 2 INJECTION, SOLUTION INTRAVENOUS at 10:05

## 2023-05-31 RX ADMIN — FENTANYL CITRATE 100 MCG: 50 INJECTION, SOLUTION INTRAMUSCULAR; INTRAVENOUS at 10:05

## 2023-05-31 RX ADMIN — ALBUTEROL SULFATE 2.5 MG: 2.5 SOLUTION RESPIRATORY (INHALATION) at 09:05

## 2023-05-31 RX ADMIN — ACETAMINOPHEN 1000 MG: 500 TABLET, FILM COATED ORAL at 09:05

## 2023-05-31 RX ADMIN — ONDANSETRON HYDROCHLORIDE 4 MG: 2 INJECTION INTRAMUSCULAR; INTRAVENOUS at 11:05

## 2023-05-31 RX ADMIN — SODIUM CHLORIDE, SODIUM LACTATE, POTASSIUM CHLORIDE, AND CALCIUM CHLORIDE: 600; 310; 30; 20 INJECTION, SOLUTION INTRAVENOUS at 10:05

## 2023-05-31 RX ADMIN — KETOROLAC TROMETHAMINE 30 MG: 30 INJECTION, SOLUTION INTRAMUSCULAR; INTRAVENOUS at 11:05

## 2023-05-31 RX ADMIN — PROPOFOL 200 MG: 10 INJECTION, EMULSION INTRAVENOUS at 10:05

## 2023-05-31 RX ADMIN — LIDOCAINE HYDROCHLORIDE 50 MG: 20 INJECTION, SOLUTION INTRAVENOUS at 10:05

## 2023-05-31 RX ADMIN — MIDAZOLAM HYDROCHLORIDE 2 MG: 1 INJECTION, SOLUTION INTRAMUSCULAR; INTRAVENOUS at 10:05

## 2023-05-31 RX ADMIN — DEXAMETHASONE SODIUM PHOSPHATE 8 MG: 4 INJECTION, SOLUTION INTRAMUSCULAR; INTRAVENOUS at 10:05

## 2023-05-31 RX ADMIN — FENTANYL CITRATE 25 MCG: 50 INJECTION, SOLUTION INTRAMUSCULAR; INTRAVENOUS at 10:05

## 2023-05-31 NOTE — OR NURSING
VSS on RA. Pt denies pain. No dressing. PIV C/D/I. Meets Phase I discharge criteria. Ready for transfer to Phase II. Contact notified.

## 2023-05-31 NOTE — PLAN OF CARE
Jagdeep Brigido Ariza has met all discharge criteria from Phase II. Vital Signs are stable, ambulating  without difficulty. Discharge instructions given, patient verbalized understanding. Discharged from facility via wheelchair in stable condition.

## 2023-05-31 NOTE — OP NOTE
[facility]  Operative Note    Date: 05/31/2023    Pre-Op Diagnosis: Left renal stone  Gross hematuria    Post-Op Diagnosis: same    Procedure(s) Performed:   1.  Left ESWL  2. Cystoscopy    Specimen(s): none    Staff Surgeon: Arnaldo Nye MD    Assistant Surgeon: Jonnathan Little MD     Anesthesia: LMA    Indications: Jagdeep Ariza is a 27 y.o. male with a  left renal stone presenting for definitive stone management. The stone is radio-opaque on KUB. He also has gross hematuria and presents for cystoscopy. He has a prior CTU which shows no abnormalities.    Findings:   - Stone radiopaque on fluoro. 3000 shocks delivered at 1.5 shocks per second  - Cystoscopy with no tumors, lesions, or diverticula. Stone fragments seen effluxing from left UO during ESWL  - Stone shadow softened at conclusion of ESWL    Estimated Blood Loss: none    Drains: None    Procedure in Detail:  After risk, benefits, and possible complications of ESWL were discussed, the patient elected to undergo the procedure and informed consent was obtained. All questions were answered in the pre-operative area and the patient was transferred to the lithotripsy suite and placed on the lithotriptor table. SCDs were applied and working.  Time out was preformed, richard-procedural antibiotics were administered.    The patient's Left-sided stone was aligned on the X-Y- and Z axis.  MAC anesthesia was administered.  When the patient was adequately sedated, 3000 thousand shocks were administered at a rate of 90 shocks per second, beginning at 16 KV of power and advanced to 26 KV. Intermittent fluoroscopy was used to monitor fragmentation of the stone.    Cystoscopy was performed at this time as well. A flexible cystoscope was inserted per urethra and advanced into the bladder. Urethra and prostate were normal upon entry with no lesions or masses. The bladder was surveyed in a systematic fashion with normal mucosa and no abnormalities  identified. There was scant blood and stone fragments seen effluxing from the left UO during the ESWL.    At the end of the procedure the stone shadow softened well.      The patient tolerated the procedure well and was transferred to the PACU in stable condition.      Plan: The patient will follow up with Radha Kearney in 1 month with a KUB prior.  The patient was given prescriptions for flomax.  The patient will strain all urine and bring any fragments to the follow up appointment for stone analysis.      Jonnathan Little MD      Attestation:  I have reviewed the notes, assessments, and/or procedures documented by Dr. Little and I concur with her/his documentation of Jagdeep Ariza.     I was present for the entire procedure.    David Nye MD

## 2023-05-31 NOTE — BRIEF OP NOTE
[facility] - Surgery ([place])  Brief Operative Note    Surgery Date: 5/31/2023     Surgeon(s) and Role:     * Arnaldo Nye MD - Primary    Assisting Surgeon: None    Pre-op Diagnosis:  Nephrolithiasis [N20.0]  Gross hematuria [R31.0]    Post-op Diagnosis:  Post-Op Diagnosis Codes:     * Nephrolithiasis [N20.0]     * Gross hematuria [R31.0]    Procedure(s) (LRB):  LITHOTRIPSY, ESWL (Left)  CYSTOSCOPY (N/A)    Anesthesia: General    Operative Findings:   - Stone radiopaque on fluoro. 3000 shocks delivered at 1.5 shocks per second  - Cystoscopy with no tumors, lesions, or diverticula. Stone fragments seen effluxing from left UO during ESWL  - Stone shadow softened at conclusion of ESWL    Estimated Blood Loss: * No values recorded between 5/31/2023 10:54 AM and 5/31/2023 11:05 AM *         Specimens:   Specimen (24h ago, onward)      None              Discharge Note    OUTCOME: Patient tolerated treatment/procedure well without complication and is now ready for discharge.    DISPOSITION: Home or Self Care    FINAL DIAGNOSIS:  Nephrolithiasis    FOLLOWUP: In clinic    DISCHARGE INSTRUCTIONS:    Discharge Procedure Orders   Diet Adult Regular     Activity as tolerated

## 2023-05-31 NOTE — PATIENT INSTRUCTIONS
ESWL post-op instructions:  You may see some blood in your urine while the stone fragments are passing and a few days afterward. Do not be alarmed, even if the urine was clear for a while. Push fluids and refrain from strenuous activity until clearing occurs. If you have difficulty passing clots or don't improve, call us. You can also try sitting in a warm tub of water to help to urinate if needed. Avoid medications such as Aspirin, Advil, Motrin, Plavix, or Coumadin, which thin the blood and cause bleeding.  If you have never had your stones analyzed, strain all urine and bring stone fragments with you to your next appointment.  Diet:  You may return to your normal diet immediately. Alcohol, spicy foods, acidy foods and drinks with caffeine may cause irritation or frequency and should be used in moderation. To keep your urine flowing freely and to avoid constipation, drink plenty of fluids during the day (8-10 glasses).  Activity:  While the kidney is healing do not engage in strenuous activity. If you are active, you may see more blood in the urine. We would suggest cutting down your activity under these circumstances until the bleeding has stopped, perhaps two weeks.  Bowels:  It is important to keep your bowels regular during the postoperative period. Straining with bowel movements can cause bleeding. A bowel movement every other day is reasonable. Use a mild over-the-counter laxative if needed, such as Milk of Magnesia 2-3 tablespoons, or 1-2 Dulcolax tablets. Call if you continue to have problems. Narcotics can worsen constipation; if you had been taking narcotics for pain, before, during or after your surgery, you may be constipated. Ditropan for bladder spasms may also cause constipation.  Problems you should report to us:  Fevers over 101.5 degrees Fahrenheit.   Inability to urinate.   Drug reactions (hives, rash, nausea, vomiting, diarrhea)   Severe burning or pain with urination that is not improving.    You will also have some burning with urination. This is normal after stone therapy and is also expected while the stent is in place. This burning should be mild or moderate and should improve over time. Severe burning, especially when it is not improving, can be a sign of infection.  Follow-up  After the stone has been fragmented you will likely need an x-ray prior to next clinic appointment    Bring stone fragments with you to your next appt.     In some select cases it is important to not leave the string on the stent.  The stent is usually removed 1-4 weeks after treatment.    Call Urology at [phone] with any problems

## 2023-05-31 NOTE — ANESTHESIA POSTPROCEDURE EVALUATION
Anesthesia Post Evaluation    Patient: Jagdeep Ariza    Procedure(s) Performed: Procedure(s) (LRB):  LITHOTRIPSY, ESWL (Left)  CYSTOSCOPY (N/A)    Final Anesthesia Type: general      Patient location during evaluation: PACU  Patient participation: Yes- Able to Participate  Level of consciousness: awake and alert  Post-procedure vital signs: reviewed and stable  Pain management: adequate  Airway patency: patent    PONV status at discharge: No PONV  Anesthetic complications: no      Cardiovascular status: blood pressure returned to baseline  Respiratory status: unassisted  Hydration status: euvolemic  Follow-up not needed.          Vitals Value Taken Time   /67 05/31/23 1207   Temp 36.2 °C (97.1 °F) 05/31/23 1137   Pulse 57 05/31/23 1209   Resp 16 05/31/23 1207   SpO2 100 % 05/31/23 1209   Vitals shown include unvalidated device data.      No case tracking events are documented in the log.      Pain/Tang Score: Pain Rating Prior to Med Admin: 0 (5/31/2023  9:24 AM)  Tang Score: 10 (5/31/2023 12:07 PM)

## 2023-05-31 NOTE — TRANSFER OF CARE
Anesthesia Transfer of Care Note    Patient: Jagdeep Ariza    Procedure(s) Performed: Procedure(s) (LRB):  LITHOTRIPSY, ESWL (Left)  CYSTOSCOPY (N/A)    Patient location: PACU    Anesthesia Type: general    Transport from OR: Transported from OR on room air with adequate spontaneous ventilation    Post pain: adequate analgesia    Post assessment: no apparent anesthetic complications    Post vital signs: stable    Level of consciousness: awake    Nausea/Vomiting: no nausea/vomiting    Complications: none    Transfer of care protocol was followed      Last vitals:   Visit Vitals  /67 (BP Location: Right arm, Patient Position: Lying)   Pulse 79   Temp 36.2 °C (97.1 °F) (Temporal)   Resp 16   SpO2 100%

## 2023-05-31 NOTE — ANESTHESIA PROCEDURE NOTES
Intubation    Date/Time: 5/31/2023 10:43 AM  Performed by: Luis Antonio Jo CRNA  Authorized by: Darrell Power MD     Intubation:     Induction:  Intravenous    Intubated:  Postinduction    Mask Ventilation:  Easy mask    Attempted By:  CRNA    Laryngeal View Grade: Grade I - full view of cords      Difficult Airway Encountered?: No      Complications:  None    Airway Device:  Supraglottic airway/LMA    Airway Device Size:  5.0    Style/Cuff Inflation:  Uncuffed    Secured at:  The lips    Placement Verified By:  Capnometry    Complicating Factors:  None    Findings Post-Intubation:  BS equal bilateral

## 2023-06-01 VITALS
DIASTOLIC BLOOD PRESSURE: 69 MMHG | SYSTOLIC BLOOD PRESSURE: 132 MMHG | HEART RATE: 62 BPM | RESPIRATION RATE: 16 BRPM | OXYGEN SATURATION: 100 % | TEMPERATURE: 98 F

## 2023-06-05 NOTE — PROGRESS NOTES
FAMILY MEDICINE  [facility]    Reason for visit:   Chief Complaint   Patient presents with    Establish Care    Annual Exam         SUBJECTIVE: Jagdeep Ariza is a 27 y.o. male  - with ADHD, asthma, nephrolithiasis presents as a new patient to establish care and annual physical. Last PCP none recently    Urology Dr. Nye    Jagdeep Ariza recently had lithotripsy for renal stone on 5/31/23. Today he reports that he is doing much better. No hematuria or pain.     1. Attention deficit disorder with hyperactivity    He reports that he did have a history of hyperactivity as a child however did fairly well in school was able to compensate.  He reports recently since he has been working at home who is found it more difficult to focus and concentrate which caused him to care for diagnosis her ADHD.  He has been doing well with his medications.    Interval history: NA  Daily Activity: work from home    Age diagnosed: 27-29 yo  Diagnosed by: online psychiatry  Initial evaluation present in chart: No    Past medications: Vyvanse (anxious)   Reasons for changing past medications: see above    Current medications:   Dextroamphetamine 5 mg TID however he only takes once a day     reviewed: reviewed    Concerns with medication: denies. He may need a new provider for medications            Review of Systems   All other systems reviewed and are negative.    HEALTH MAINTENANCE:   Health Maintenance   Topic Date Due    Hepatitis C Screening  Never done    Lipid Panel  Never done    TETANUS VACCINE  01/01/2032     Health Maintenance Topics with due status: Not Due       Topic Last Completion Date    TETANUS VACCINE 01/01/2022    Influenza Vaccine Not Due     Health Maintenance Due   Topic Date Due    Hepatitis C Screening  Never done    Lipid Panel  Never done    COVID-19 Vaccine (1) Never done    HIV Screening  Never done       HISTORY:   Past Medical History:   Diagnosis Date    ADHD (attention  deficit hyperactivity disorder)     Asthma     Gross hematuria 2023    Kidney stone        Past Surgical History:   Procedure Laterality Date    CYSTOSCOPY N/A 2023    Procedure: CYSTOSCOPY;  Surgeon: Arnaldo Nye MD;  Location: [facility];  Service: Urology;  Laterality: N/A;    EXTRACORPOREAL SHOCK WAVE LITHOTRIPSY Left 2023    Procedure: LITHOTRIPSY, ESWL;  Surgeon: Arnaldo Nye MD;  Location: [facility];  Service: Urology;  Laterality: Left;  AMELIAO HAS BEEN CONTACTED CONF# [id number]    JOINT REPLACEMENT  2022    AC shoulder joint repair    right shoulder surgery  2022       Family History   Problem Relation Age of Onset    Cancer Mother         BCCs removed    Arthritis Father         Arthritis of the Knee    Asthma Father         Allergy induced Asthma - similar to self    Nephrolithiasis Father     No Known Problems Sister     Cancer Maternal Grandmother         Thyroid Cancer    COPD Maternal Grandmother     Depression Maternal Grandmother     Heart disease Maternal Grandmother          of Chronic Heart Failure - had a valve replacement    Vision loss Maternal Grandmother         Glaucoma and macular degeneration    Cancer Maternal Grandfather         Lung and Skin Cancer - malignant melanoma    Arthritis Paternal Grandmother         Hip and knee Replacement    Arthritis Paternal Grandfather         Double hip replacement       Social History     Tobacco Use    Smoking status: Never    Smokeless tobacco: Never   Substance Use Topics    Alcohol use: Yes     Alcohol/week: 3.0 standard drinks     Types: 1 Glasses of wine, 2 Cans of beer per week     Comment: Socially at weekends    Drug use: Not Currently     Frequency: 1.0 times per week     Types: Marijuana     Comment: Occasional marijuana use when in [place]       Social History     Social History Narrative    . No children. From [place]. [place]oved to [place] with partner who is from [place]. 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 This is her last year and she plans to do residency at [place]. He works from home       ALLERGIES:   Review of patient's allergies indicates:   Allergen Reactions    Animal dander Hives, Itching, Rash and Shortness Of Breath       MEDS:   Outpatient Medications as of 6/6/2023   Medication Sig Dispense Refill    albuterol (PROVENTIL/VENTOLIN HFA) 90 mcg/actuation inhaler Inhale 2 puffs into the lungs every 4 (four) hours as needed for Wheezing or Shortness of Breath. Rescue 18 g 11    dextroamphetamine sulfate (DEXTROSTAT) 5 MG tablet Take 5 mg by mouth once daily. Takes 5-10mg po daily on work days      montelukast (SINGULAIR) 10 mg tablet Take 1 tablet (10 mg total) by mouth every evening. 90 tablet 3    tamsulosin (FLOMAX) 0.4 mg Cap Take 1 capsule (0.4 mg total) by mouth once daily. (Patient not taking: Reported on 6/6/2023) 30 capsule 0     No current facility-administered medications on file as of 6/6/2023.       Vital signs:   Vitals:    06/06/23 1010   BP: 124/64   Pulse: (!) 59   SpO2: 99%   Weight: 78.3 kg (172 lb 9.9 oz)   Height: 6' (1.829 m)     Body mass index is 23.41 kg/m².    PHYSICAL EXAM:     Physical Exam  Vitals reviewed.   Constitutional:       General: He is not in acute distress.     Appearance: Normal appearance.   HENT:      Head: Normocephalic and atraumatic.      Right Ear: Tympanic membrane and ear canal normal.      Left Ear: Tympanic membrane and ear canal normal.      Nose: Nose normal.      Mouth/Throat:      Pharynx: Uvula midline.   Eyes:      General: No scleral icterus.     Conjunctiva/sclera: Conjunctivae normal.   Neck:      Thyroid: No thyromegaly.      Vascular: Normal carotid pulses. No carotid bruit or JVD.      Trachea: Trachea normal.   Cardiovascular:      Rate and Rhythm: Normal rate and regular rhythm.      Pulses: Normal pulses.      Heart sounds: Normal heart sounds. No murmur heard.    No friction rub. No  gallop.   Pulmonary:      Effort: Pulmonary effort is normal.      Breath sounds: Normal breath sounds. No decreased breath sounds, wheezing, rhonchi or rales.   Abdominal:      General: Bowel sounds are normal.      Palpations: Abdomen is soft. There is no hepatomegaly.      Tenderness: There is no abdominal tenderness.   Musculoskeletal:      Cervical back: Neck supple.      Right lower leg: No edema.      Left lower leg: No edema.   Lymphadenopathy:      Cervical: No cervical adenopathy.   Skin:     General: Skin is warm.      Capillary Refill: Capillary refill takes less than 2 seconds.      Nails: There is no clubbing.   Neurological:      Mental Status: He is alert and oriented to person, place, and time.           PERTINENT RESULTS:   No visits with results within 1 Week(s) from this visit.   Latest known visit with results is:   Lab Visit on 05/02/2023   Component Date Value Ref Range Status    Sodium 05/02/2023 138  136 - 145 mmol/L Final    Potassium 05/02/2023 4.1  3.5 - 5.1 mmol/L Final    Chloride 05/02/2023 104  95 - 110 mmol/L Final    CO2 05/02/2023 27  23 - 29 mmol/L Final    Glucose 05/02/2023 88  70 - 110 mg/dL Final    BUN 05/02/2023 10  6 - 20 mg/dL Final    Creatinine 05/02/2023 1.0  0.5 - 1.4 mg/dL Final    Calcium 05/02/2023 9.7  8.7 - 10.5 mg/dL Final    Anion Gap 05/02/2023 7 (L)  8 - 16 mmol/L Final    eGFR 05/02/2023 >60  >60 mL/min/1.73 m^2 Final     X-Ray KUB  Narrative: EXAMINATION:  XR KUB    CLINICAL HISTORY:  Calculus of kidney    TECHNIQUE:  Single AP supine view of the abdomen (KUB) was performed    COMPARISON:  None    FINDINGS:  Nonspecific, nonobstructive bowel gas pattern.  Large retained stool in the colon.  Calcification projecting over the upper pole left kidney measures 5 mm.  No suspicious calcifications along the course of either ureter or urinary bladder.  Impression: Left nephrolithiasis.    Electronically signed by: tC  Vitter  Date:    05/11/2023  Time:    13:50      ASSESSMENT/PLAN:    1. Routine medical exam  Overview:  - preventative health counseling      Orders:  -     Lipid Panel; Future; Expected date: 06/06/2023  -     HIV 1/2 Ag/Ab (4th Gen); Future; Expected date: 06/06/2023  -     Hepatitis C Antibody; Future; Expected date: 06/06/2023  -     Hemoglobin A1C; Future; Expected date: 06/06/2023  -     CBC Auto Differential; Future; Expected date: 06/06/2023  -     Hepatic Function Panel; Future; Expected date: 06/06/2023    2. Nephrolithiasis  Overview:  - 5/10/23 CT urogram: Nonobstructing left renal stone.  Unremarkable right kidney.  - 5/11/23 left nephrolithiasis  - s/p lithotripsy 5/31/23  - followed by Urology      3. Bladder wall thickening  Overview:  - 5/10/23 CT urogram:Mild diffuse urinary bladder thickening could be related to under distension.  Cystitis can give a similar appearance in the appropriate setting  - 5/31/23 cystoscopy normal      4. Mild intermittent asthma without complication  Overview:  - well controlled  - continue current management plan   - patient encouraged to notify me with any changes      5. Attention deficit hyperactivity disorder (ADHD), combined type  Overview:  - diagnosed by NP psychiatry   - discussed that if he would like to transfer care, I will need a copy of her initial evaluation      6. Screening for HIV (human immunodeficiency virus)  -     HIV 1/2 Ag/Ab (4th Gen); Future; Expected date: 06/06/2023    7. Need for hepatitis C screening test  -     Hepatitis C Antibody; Future; Expected date: 06/06/2023          ORDERS:   Orders Placed This Encounter    Lipid Panel    HIV 1/2 Ag/Ab (4th Gen)    Hepatitis C Antibody    Hemoglobin A1C    CBC Auto Differential    Hepatic Function Panel       Vaccines recommended: Covid-19 booster    Follow-up in 1 year pending results or sooner if any concerns.      This note is dictated using the M*Modal Fluency Direct word recognition program.  There are word recognition mistakes that are occasionally missed on review.    Dr. Mary Lou Beckford D.O.   [facility]

## 2023-06-06 ENCOUNTER — PATIENT MESSAGE (OUTPATIENT)
Dept: PRIMARY CARE CLINIC | Facility: CLINIC | Age: 28
End: 2023-06-06

## 2023-06-06 ENCOUNTER — OFFICE VISIT (OUTPATIENT)
Dept: PRIMARY CARE CLINIC | Facility: CLINIC | Age: 28
End: 2023-06-06
Attending: FAMILY MEDICINE
Payer: COMMERCIAL

## 2023-06-06 VITALS
DIASTOLIC BLOOD PRESSURE: 64 MMHG | HEART RATE: 59 BPM | BODY MASS INDEX: 23.38 KG/M2 | OXYGEN SATURATION: 99 % | SYSTOLIC BLOOD PRESSURE: 124 MMHG | WEIGHT: 172.63 LBS | HEIGHT: 72 IN

## 2023-06-06 DIAGNOSIS — F90.2 ATTENTION DEFICIT HYPERACTIVITY DISORDER (ADHD), COMBINED TYPE: ICD-10-CM

## 2023-06-06 DIAGNOSIS — Z00.00 ROUTINE MEDICAL EXAM: Primary | ICD-10-CM

## 2023-06-06 DIAGNOSIS — N20.0 NEPHROLITHIASIS: ICD-10-CM

## 2023-06-06 DIAGNOSIS — F90.2 ATTENTION DEFICIT HYPERACTIVITY DISORDER (ADHD), COMBINED TYPE: Primary | ICD-10-CM

## 2023-06-06 DIAGNOSIS — N32.89 BLADDER WALL THICKENING: ICD-10-CM

## 2023-06-06 DIAGNOSIS — Z11.4 SCREENING FOR HIV (HUMAN IMMUNODEFICIENCY VIRUS): ICD-10-CM

## 2023-06-06 DIAGNOSIS — J45.20 MILD INTERMITTENT ASTHMA WITHOUT COMPLICATION: ICD-10-CM

## 2023-06-06 DIAGNOSIS — Z11.59 NEED FOR HEPATITIS C SCREENING TEST: ICD-10-CM

## 2023-06-06 PROBLEM — R31.0 GROSS HEMATURIA: Status: RESOLVED | Noted: 2023-05-31 | Resolved: 2023-06-06

## 2023-06-06 PROCEDURE — 99999 PR PBB SHADOW E&M-EST. PATIENT-LVL III: ICD-10-PCS | Mod: PBBFAC,,, | Performed by: FAMILY MEDICINE

## 2023-06-06 PROCEDURE — 1160F RVW MEDS BY RX/DR IN RCRD: CPT | Mod: CPTII,S$GLB,, | Performed by: FAMILY MEDICINE

## 2023-06-06 PROCEDURE — 1159F PR MEDICATION LIST DOCUMENTED IN MEDICAL RECORD: ICD-10-PCS | Mod: CPTII,S$GLB,, | Performed by: FAMILY MEDICINE

## 2023-06-06 PROCEDURE — 3008F BODY MASS INDEX DOCD: CPT | Mod: CPTII,S$GLB,, | Performed by: FAMILY MEDICINE

## 2023-06-06 PROCEDURE — 99999 PR PBB SHADOW E&M-EST. PATIENT-LVL III: CPT | Mod: PBBFAC,,, | Performed by: FAMILY MEDICINE

## 2023-06-06 PROCEDURE — 3078F PR MOST RECENT DIASTOLIC BLOOD PRESSURE < 80 MM HG: ICD-10-PCS | Mod: CPTII,S$GLB,, | Performed by: FAMILY MEDICINE

## 2023-06-06 PROCEDURE — 3074F PR MOST RECENT SYSTOLIC BLOOD PRESSURE < 130 MM HG: ICD-10-PCS | Mod: CPTII,S$GLB,, | Performed by: FAMILY MEDICINE

## 2023-06-06 PROCEDURE — 99395 PREV VISIT EST AGE 18-39: CPT | Mod: S$GLB,,, | Performed by: FAMILY MEDICINE

## 2023-06-06 PROCEDURE — 99395 PR PREVENTIVE VISIT,EST,18-39: ICD-10-PCS | Mod: S$GLB,,, | Performed by: FAMILY MEDICINE

## 2023-06-06 PROCEDURE — 3008F PR BODY MASS INDEX (BMI) DOCUMENTED: ICD-10-PCS | Mod: CPTII,S$GLB,, | Performed by: FAMILY MEDICINE

## 2023-06-06 PROCEDURE — 3074F SYST BP LT 130 MM HG: CPT | Mod: CPTII,S$GLB,, | Performed by: FAMILY MEDICINE

## 2023-06-06 PROCEDURE — 1159F MED LIST DOCD IN RCRD: CPT | Mod: CPTII,S$GLB,, | Performed by: FAMILY MEDICINE

## 2023-06-06 PROCEDURE — 1160F PR REVIEW ALL MEDS BY PRESCRIBER/CLIN PHARMACIST DOCUMENTED: ICD-10-PCS | Mod: CPTII,S$GLB,, | Performed by: FAMILY MEDICINE

## 2023-06-06 PROCEDURE — 3078F DIAST BP <80 MM HG: CPT | Mod: CPTII,S$GLB,, | Performed by: FAMILY MEDICINE

## 2023-06-07 ENCOUNTER — LAB VISIT (OUTPATIENT)
Dept: LAB | Facility: HOSPITAL | Age: 28
End: 2023-06-07
Attending: FAMILY MEDICINE
Payer: COMMERCIAL

## 2023-06-07 DIAGNOSIS — Z11.4 SCREENING FOR HIV (HUMAN IMMUNODEFICIENCY VIRUS): ICD-10-CM

## 2023-06-07 DIAGNOSIS — Z11.59 NEED FOR HEPATITIS C SCREENING TEST: ICD-10-CM

## 2023-06-07 DIAGNOSIS — Z00.00 ROUTINE MEDICAL EXAM: ICD-10-CM

## 2023-06-07 LAB
ALBUMIN SERPL BCP-MCNC: 4.5 G/DL (ref 3.5–5.2)
ALP SERPL-CCNC: 57 U/L (ref 55–135)
ALT SERPL W/O P-5'-P-CCNC: 18 U/L (ref 10–44)
AST SERPL-CCNC: 21 U/L (ref 10–40)
BASOPHILS # BLD AUTO: 0.05 K/UL (ref 0–0.2)
BASOPHILS NFR BLD: 0.8 % (ref 0–1.9)
BILIRUB DIRECT SERPL-MCNC: 0.5 MG/DL (ref 0.1–0.3)
BILIRUB SERPL-MCNC: 1.3 MG/DL (ref 0.1–1)
CHOLEST SERPL-MCNC: 138 MG/DL (ref 120–199)
CHOLEST/HDLC SERPL: 2.1 {RATIO} (ref 2–5)
DIFFERENTIAL METHOD: ABNORMAL
EOSINOPHIL # BLD AUTO: 0.4 K/UL (ref 0–0.5)
EOSINOPHIL NFR BLD: 5.5 % (ref 0–8)
ERYTHROCYTE [DISTWIDTH] IN BLOOD BY AUTOMATED COUNT: 12 % (ref 11.5–14.5)
ESTIMATED AVG GLUCOSE: 91 MG/DL (ref 68–131)
HBA1C MFR BLD: 4.8 % (ref 4–5.6)
HCT VFR BLD AUTO: 44.8 % (ref 40–54)
HCV AB SERPL QL IA: NORMAL
HDLC SERPL-MCNC: 67 MG/DL (ref 40–75)
HDLC SERPL: 48.6 % (ref 20–50)
HGB BLD-MCNC: 15.2 G/DL (ref 14–18)
HIV 1+2 AB+HIV1 P24 AG SERPL QL IA: NORMAL
IMM GRANULOCYTES # BLD AUTO: 0.01 K/UL (ref 0–0.04)
IMM GRANULOCYTES NFR BLD AUTO: 0.2 % (ref 0–0.5)
LDLC SERPL CALC-MCNC: 58.8 MG/DL (ref 63–159)
LYMPHOCYTES # BLD AUTO: 2.3 K/UL (ref 1–4.8)
LYMPHOCYTES NFR BLD: 34.6 % (ref 18–48)
MCH RBC QN AUTO: 31.2 PG (ref 27–31)
MCHC RBC AUTO-ENTMCNC: 33.9 G/DL (ref 32–36)
MCV RBC AUTO: 92 FL (ref 82–98)
MONOCYTES # BLD AUTO: 0.7 K/UL (ref 0.3–1)
MONOCYTES NFR BLD: 10.8 % (ref 4–15)
NEUTROPHILS # BLD AUTO: 3.2 K/UL (ref 1.8–7.7)
NEUTROPHILS NFR BLD: 48.1 % (ref 38–73)
NONHDLC SERPL-MCNC: 71 MG/DL
NRBC BLD-RTO: 0 /100 WBC
PLATELET # BLD AUTO: 150 K/UL (ref 150–450)
PMV BLD AUTO: 12.5 FL (ref 9.2–12.9)
PROT SERPL-MCNC: 7.6 G/DL (ref 6–8.4)
RBC # BLD AUTO: 4.87 M/UL (ref 4.6–6.2)
TRIGL SERPL-MCNC: 61 MG/DL (ref 30–150)
WBC # BLD AUTO: 6.56 K/UL (ref 3.9–12.7)

## 2023-06-07 PROCEDURE — 85025 COMPLETE CBC W/AUTO DIFF WBC: CPT | Performed by: FAMILY MEDICINE

## 2023-06-07 PROCEDURE — 36415 COLL VENOUS BLD VENIPUNCTURE: CPT | Mod: PN | Performed by: FAMILY MEDICINE

## 2023-06-07 PROCEDURE — 80061 LIPID PANEL: CPT | Performed by: FAMILY MEDICINE

## 2023-06-07 PROCEDURE — 83036 HEMOGLOBIN GLYCOSYLATED A1C: CPT | Performed by: FAMILY MEDICINE

## 2023-06-07 PROCEDURE — 87389 HIV-1 AG W/HIV-1&-2 AB AG IA: CPT | Performed by: FAMILY MEDICINE

## 2023-06-07 PROCEDURE — 86803 HEPATITIS C AB TEST: CPT | Performed by: FAMILY MEDICINE

## 2023-06-07 PROCEDURE — 80076 HEPATIC FUNCTION PANEL: CPT | Performed by: FAMILY MEDICINE

## 2023-06-08 ENCOUNTER — PATIENT MESSAGE (OUTPATIENT)
Dept: PRIMARY CARE CLINIC | Facility: CLINIC | Age: 28
End: 2023-06-08
Payer: COMMERCIAL

## 2023-06-08 DIAGNOSIS — R17 ELEVATED BILIRUBIN: Primary | ICD-10-CM

## 2023-07-23 ENCOUNTER — PATIENT MESSAGE (OUTPATIENT)
Dept: PRIMARY CARE CLINIC | Facility: CLINIC | Age: 28
End: 2023-07-23
Payer: COMMERCIAL

## 2023-07-23 DIAGNOSIS — L98.9 SKIN LESIONS: Primary | ICD-10-CM

## 2023-07-24 NOTE — TELEPHONE ENCOUNTER
Orders Placed This Encounter    Ambulatory referral/consult to Dermatology     Dr. Mary Lou Beckford D.O.   [facility]

## 2023-09-04 DIAGNOSIS — J31.0 CHRONIC RHINITIS: ICD-10-CM

## 2023-09-04 DIAGNOSIS — J45.40 MODERATE PERSISTENT ASTHMA WITHOUT COMPLICATION: ICD-10-CM

## 2023-09-04 NOTE — TELEPHONE ENCOUNTER
No care due was identified.  [facility] Embedded Care Due Messages. Reference number: [id number].   9/04/2023 3:17:01 PM CDT

## 2023-09-05 RX ORDER — MONTELUKAST SODIUM 10 MG/1
10 TABLET ORAL NIGHTLY
Qty: 90 TABLET | Refills: 3 | Status: SHIPPED | OUTPATIENT
Start: 2023-09-05 | End: 2023-09-05

## 2023-09-05 RX ORDER — MONTELUKAST SODIUM 10 MG/1
10 TABLET ORAL NIGHTLY
Qty: 90 TABLET | Refills: 3 | Status: SHIPPED | OUTPATIENT
Start: 2023-09-05

## 2023-09-05 NOTE — TELEPHONE ENCOUNTER
Refill Routing Note   Medication(s) are not appropriate for processing by [facility] for the following reason(s):      No active prescription written by provider    [facility] action(s):  Defer Care Due:  None identified              Appointments  past 12m or future 3m with PCP    Date Provider   Last Visit   6/6/2023 Mary Lou Beckford, DO   Next Visit   Visit date not found Mary Lou Beckford, DO   ED visits in past 90 days: 0        Note composed:3:46 PM 09/05/2023

## 2023-09-11 PROBLEM — Z00.00 ROUTINE MEDICAL EXAM: Status: RESOLVED | Noted: 2023-06-06 | Resolved: 2023-09-11

## 2023-12-12 DIAGNOSIS — J45.40 MODERATE PERSISTENT ASTHMA WITHOUT COMPLICATION: ICD-10-CM

## 2023-12-12 DIAGNOSIS — J31.0 CHRONIC RHINITIS: ICD-10-CM

## 2023-12-13 RX ORDER — MONTELUKAST SODIUM 10 MG/1
10 TABLET ORAL NIGHTLY
Qty: 90 TABLET | Refills: 3 | OUTPATIENT
Start: 2023-12-13

## 2023-12-13 NOTE — TELEPHONE ENCOUNTER
No care due was identified.  [facility] Embedded Care Due Messages. Reference number: [id number].   12/12/2023 11:01:55 PM CST

## 2023-12-13 NOTE — TELEPHONE ENCOUNTER
No care due was identified.  [facility] Embedded Care Due Messages. Reference number: [id number].   12/12/2023 11:03:33 PM CST

## 2023-12-14 RX ORDER — ALBUTEROL SULFATE 90 UG/1
2 AEROSOL, METERED RESPIRATORY (INHALATION) EVERY 4 HOURS PRN
Qty: 54 G | Refills: 1 | Status: SHIPPED | OUTPATIENT
Start: 2023-12-14 | End: 2023-12-14 | Stop reason: SDUPTHER

## 2023-12-14 RX ORDER — ALBUTEROL SULFATE 90 UG/1
2 AEROSOL, METERED RESPIRATORY (INHALATION) EVERY 4 HOURS PRN
Qty: 54 G | Refills: 1 | Status: SHIPPED | OUTPATIENT
Start: 2023-12-14

## 2023-12-14 NOTE — TELEPHONE ENCOUNTER
Refill Routing Note   Medication(s) are not appropriate for processing by [facility] for the following reason(s):        No active prescription written by provider    [facility] action(s):  Defer               Appointments  past 12m or future 3m with PCP    Date Provider   Last Visit   6/6/2023 Mary Lou Beckford, DO   Next Visit   12/12/2023 Mary Lou Beckford, DO   ED visits in past 90 days: 0        Note composed:9:17 PM 12/13/2023

## 2024-01-12 ENCOUNTER — PATIENT MESSAGE (OUTPATIENT)
Dept: DERMATOLOGY | Facility: CLINIC | Age: 29
End: 2024-01-12
Payer: COMMERCIAL

## 2024-10-22 DIAGNOSIS — J31.0 CHRONIC RHINITIS: ICD-10-CM

## 2024-10-22 DIAGNOSIS — J45.40 MODERATE PERSISTENT ASTHMA WITHOUT COMPLICATION: ICD-10-CM

## 2024-10-22 RX ORDER — MONTELUKAST SODIUM 10 MG/1
10 TABLET ORAL NIGHTLY
Qty: 90 TABLET | Refills: 0 | Status: SHIPPED | OUTPATIENT
Start: 2024-10-22 | End: 2025-01-20

## 2024-10-22 NOTE — TELEPHONE ENCOUNTER
Refill Encounter    PCP Visits: Recent Visits  No visits were found meeting these conditions.  Showing recent visits within past 360 days and meeting all other requirements  Future Appointments  No visits were found meeting these conditions.  Showing future appointments within next 720 days and meeting all other requirements     Last 3 Blood Pressure:   BP Readings from Last 3 Encounters:   06/06/23 124/64   05/31/23 132/69   05/18/23 115/65     Preferred Pharmacy:   [facility]/pharmacy #[id number] - [place], [place] - [address]  [address] [address]  Phone: [phone] Fax: [phone]    [facility]/pharmacy #[id number] - [place], [place] - [address]  [address] [address]  Phone: [phone] Fax: [phone]    Requested RX:  Requested Prescriptions     Pending Prescriptions Disp Refills    montelukast (SINGULAIR) 10 mg tablet [Pharmacy Med Name: MONTELUKAST SOD 10 MG TABLET] 90 tablet 3     Sig: TAKE 1 TABLET BY MOUTH EVERY DAY IN THE EVENING      RX Route: Normal

## 2024-10-22 NOTE — TELEPHONE ENCOUNTER
Care Due:                  Date            Visit Type   Department     Provider  --------------------------------------------------------------------------------                                NP -                              PRIMARY      [facility] PRIMARY  Last Visit: 06-      CARE ([facility])   CARE           Mary Lou Beckford  Next Visit: None Scheduled  None         None Found                                                            Last  Test          Frequency    Reason                     Performed    Due Date  --------------------------------------------------------------------------------    Office Visit  15 months..  albuterol, montelukast...  06- 08-    [facility] Embedded Care Due Messages. Reference number: [id number].   10/22/2024 2:16:23 AM CDT

## (undated) DEVICE — SET CYSTO IRR DRP CHMBR 84IN

## (undated) DEVICE — BRUSH SCRUB SURGICALW/BETADINE

## (undated) DEVICE — SOL POVIDONE SCRUB IODINE 4 OZ

## (undated) DEVICE — ELECTRODE REM PLYHSV RETURN 9

## (undated) DEVICE — CATH CONE TIP

## (undated) DEVICE — Device

## (undated) DEVICE — CATH URTRL OPEN END STR TIP 5F

## (undated) DEVICE — SOL IRR SOD CHL .9% POUR

## (undated) DEVICE — GLOVE BIOGEL SKINSENSE PI 7.5

## (undated) DEVICE — SEAL UROLOGY

## (undated) DEVICE — VIDEO RENTAL SERVICE